# Patient Record
Sex: MALE | Race: WHITE | HISPANIC OR LATINO
[De-identification: names, ages, dates, MRNs, and addresses within clinical notes are randomized per-mention and may not be internally consistent; named-entity substitution may affect disease eponyms.]

---

## 2017-05-27 ENCOUNTER — RX RENEWAL (OUTPATIENT)
Age: 32
End: 2017-05-27

## 2017-07-27 ENCOUNTER — APPOINTMENT (OUTPATIENT)
Dept: FAMILY MEDICINE | Facility: CLINIC | Age: 32
End: 2017-07-27

## 2019-08-01 ENCOUNTER — APPOINTMENT (OUTPATIENT)
Dept: FAMILY MEDICINE | Facility: CLINIC | Age: 34
End: 2019-08-01
Payer: COMMERCIAL

## 2019-08-01 VITALS
HEIGHT: 68 IN | RESPIRATION RATE: 15 BRPM | BODY MASS INDEX: 19.85 KG/M2 | OXYGEN SATURATION: 99 % | SYSTOLIC BLOOD PRESSURE: 110 MMHG | HEART RATE: 72 BPM | DIASTOLIC BLOOD PRESSURE: 70 MMHG | WEIGHT: 131 LBS

## 2019-08-01 PROCEDURE — 99395 PREV VISIT EST AGE 18-39: CPT

## 2019-08-07 ENCOUNTER — TRANSCRIPTION ENCOUNTER (OUTPATIENT)
Age: 34
End: 2019-08-07

## 2019-08-08 LAB
25(OH)D3 SERPL-MCNC: 47.4 NG/ML
ALBUMIN SERPL ELPH-MCNC: 4.6 G/DL
ALP BLD-CCNC: 52 U/L
ALT SERPL-CCNC: 10 U/L
ANION GAP SERPL CALC-SCNC: 10 MMOL/L
APPEARANCE: CLEAR
AST SERPL-CCNC: 21 U/L
BACTERIA: NEGATIVE
BASOPHILS # BLD AUTO: 0.05 K/UL
BASOPHILS NFR BLD AUTO: 1 %
BILIRUB SERPL-MCNC: 0.2 MG/DL
BILIRUBIN URINE: NEGATIVE
BLOOD URINE: NEGATIVE
BUN SERPL-MCNC: 10 MG/DL
CALCIUM SERPL-MCNC: 9.6 MG/DL
CHLORIDE SERPL-SCNC: 104 MMOL/L
CHOLEST SERPL-MCNC: 191 MG/DL
CHOLEST/HDLC SERPL: 3.3 RATIO
CO2 SERPL-SCNC: 27 MMOL/L
COLOR: YELLOW
CREAT SERPL-MCNC: 1.01 MG/DL
EOSINOPHIL # BLD AUTO: 0.41 K/UL
EOSINOPHIL NFR BLD AUTO: 7.8 %
ESTIMATED AVERAGE GLUCOSE: 108 MG/DL
GLUCOSE QUALITATIVE U: NEGATIVE
GLUCOSE SERPL-MCNC: 105 MG/DL
HBA1C MFR BLD HPLC: 5.4 %
HCT VFR BLD CALC: 49.6 %
HDLC SERPL-MCNC: 58 MG/DL
HGB BLD-MCNC: 15.7 G/DL
HYALINE CASTS: 0 /LPF
IMM GRANULOCYTES NFR BLD AUTO: 0.4 %
KETONES URINE: NEGATIVE
LDLC SERPL CALC-MCNC: 120 MG/DL
LEUKOCYTE ESTERASE URINE: NEGATIVE
LYMPHOCYTES # BLD AUTO: 1.67 K/UL
LYMPHOCYTES NFR BLD AUTO: 31.7 %
MAN DIFF?: NORMAL
MCHC RBC-ENTMCNC: 31.4 PG
MCHC RBC-ENTMCNC: 31.7 GM/DL
MCV RBC AUTO: 99.2 FL
MICROSCOPIC-UA: NORMAL
MONOCYTES # BLD AUTO: 0.45 K/UL
MONOCYTES NFR BLD AUTO: 8.6 %
NEUTROPHILS # BLD AUTO: 2.66 K/UL
NEUTROPHILS NFR BLD AUTO: 50.5 %
NITRITE URINE: NEGATIVE
PH URINE: 7.5
PLATELET # BLD AUTO: 269 K/UL
POTASSIUM SERPL-SCNC: 5.2 MMOL/L
PROT SERPL-MCNC: 7 G/DL
PROTEIN URINE: NEGATIVE
RBC # BLD: 5 M/UL
RBC # FLD: 13.4 %
RED BLOOD CELLS URINE: 3 /HPF
SODIUM SERPL-SCNC: 141 MMOL/L
SPECIFIC GRAVITY URINE: 1.02
SQUAMOUS EPITHELIAL CELLS: 0 /HPF
TESTOST SERPL-MCNC: 573 NG/DL
TRIGL SERPL-MCNC: 67 MG/DL
TSH SERPL-ACNC: 2.4 UIU/ML
UROBILINOGEN URINE: NORMAL
WBC # FLD AUTO: 5.26 K/UL
WHITE BLOOD CELLS URINE: 0 /HPF

## 2021-04-07 ENCOUNTER — APPOINTMENT (OUTPATIENT)
Dept: FAMILY MEDICINE | Facility: CLINIC | Age: 36
End: 2021-04-07
Payer: COMMERCIAL

## 2021-04-07 ENCOUNTER — NON-APPOINTMENT (OUTPATIENT)
Age: 36
End: 2021-04-07

## 2021-04-07 VITALS
BODY MASS INDEX: 19.85 KG/M2 | HEIGHT: 68 IN | DIASTOLIC BLOOD PRESSURE: 80 MMHG | HEART RATE: 62 BPM | SYSTOLIC BLOOD PRESSURE: 120 MMHG | WEIGHT: 131 LBS | OXYGEN SATURATION: 96 %

## 2021-04-07 PROCEDURE — 99072 ADDL SUPL MATRL&STAF TM PHE: CPT

## 2021-04-07 PROCEDURE — 99213 OFFICE O/P EST LOW 20 MIN: CPT

## 2021-04-07 NOTE — ASSESSMENT
[FreeTextEntry1] : acute bronchitis\par Patient was advised to take all medications as prescribed and to finish any antibiotics in their entirety. Patient was told to rest, hydrate and treat symptoms as necessary. Patient was advised to return to this office or go directly to the ER if symptoms do not improve or if any worsening occurs.\par

## 2021-04-07 NOTE — HISTORY OF PRESENT ILLNESS
[FreeTextEntry8] : 35 year old male here with complaints of feeling he cannot breath for past two nights. Patients active medications, allergies and issues were all reviewed with the patient at time of visit.\par

## 2021-04-07 NOTE — PHYSICAL EXAM
[Well Nourished] : well nourished [Regular Rhythm] : with a regular rhythm [Normal S1, S2] : normal S1 and S2 [de-identified] : left rhonchi, wheezing

## 2021-04-26 ENCOUNTER — APPOINTMENT (OUTPATIENT)
Dept: FAMILY MEDICINE | Facility: CLINIC | Age: 36
End: 2021-04-26
Payer: COMMERCIAL

## 2021-04-26 DIAGNOSIS — R19.5 OTHER FECAL ABNORMALITIES: ICD-10-CM

## 2021-04-26 DIAGNOSIS — R14.3 FLATULENCE: ICD-10-CM

## 2021-04-26 PROCEDURE — 99213 OFFICE O/P EST LOW 20 MIN: CPT | Mod: 95

## 2021-04-26 RX ORDER — AZITHROMYCIN 250 MG/1
250 TABLET, FILM COATED ORAL
Qty: 1 | Refills: 0 | Status: DISCONTINUED | COMMUNITY
Start: 2021-04-07 | End: 2021-04-26

## 2021-04-26 NOTE — PLAN
[FreeTextEntry1] : Loose stools/Gas-Supportive Care measures discussed.  Possibly related to restaurant food vs. resolving gastroenteritis. \par Advised to avoid any spicy, greasy, acidic foods that may be irritating to stomach.\par Increase hydration.  \par \par Can trial OTC Tums or Mylanta for symptoms.\par \par Discussed with Scotty LOU precautions and advised to go to seek immediate medical re-evaluation if condition worsened.\par If not improving patient to follow-up in office.   Mr. MAYITO BLAKE expressed understanding of the plan.\par

## 2021-04-26 NOTE — REVIEW OF SYSTEMS
[Abdominal Pain] : no abdominal pain [Vomiting] : no vomiting [Heartburn] : no heartburn [Melena] : no melena [Headache] : no headache [Dizziness] : no dizziness [Negative] : Genitourinary [FreeTextEntry7] : gas

## 2021-04-26 NOTE — PHYSICAL EXAM
[No Respiratory Distress] : no respiratory distress  [Normal] : affect was normal and insight and judgment were intact [de-identified] : No visible rash

## 2021-04-26 NOTE — HISTORY OF PRESENT ILLNESS
[Home] : at home, [unfilled] , at the time of the visit. [Other Location: e.g. Home (Enter Location, City,State)___] : at [unfilled] [Verbal consent obtained from patient] : the patient, [unfilled] [___ Days ago] : [unfilled] days ago [Episodic] : episodic [Nausea] : nausea [Diarrhea] : diarrhea [Constipation] : no constipation [Abdominal Pain] : no abdominal pain [OTC Remedies] : OTC remedies [Stable] : stable [FreeTextEntry8] : \par Wednesday night ate out at a diner, had taco steak. Started having diarrhea Thursday. \par No vomitting; mild nausea on and off. \par Very loose stools. \par No other diners in the party had symptoms, but no on else had the same dish.\par Gas last night. Tried TUMS. \par Has been eating his regular diet. Egg sandwich this AM with coffee. Will still have loose stools.  No blood in toilet bowl, no sharp abdominal pain \par Admits he does not drink enough water throughout the day. \par \par Medications and allergies reviewed.\par Son sick at home-mild viral syndrome. No GI symptoms. \par \par

## 2022-01-03 ENCOUNTER — APPOINTMENT (OUTPATIENT)
Dept: FAMILY MEDICINE | Facility: CLINIC | Age: 37
End: 2022-01-03
Payer: COMMERCIAL

## 2022-01-03 ENCOUNTER — TRANSCRIPTION ENCOUNTER (OUTPATIENT)
Age: 37
End: 2022-01-03

## 2022-01-03 VITALS
DIASTOLIC BLOOD PRESSURE: 80 MMHG | HEIGHT: 68 IN | BODY MASS INDEX: 21.07 KG/M2 | WEIGHT: 139 LBS | SYSTOLIC BLOOD PRESSURE: 130 MMHG

## 2022-01-03 DIAGNOSIS — J45.909 UNSPECIFIED ASTHMA, UNCOMPLICATED: ICD-10-CM

## 2022-01-03 DIAGNOSIS — J01.90 ACUTE SINUSITIS, UNSPECIFIED: ICD-10-CM

## 2022-01-03 PROCEDURE — 99213 OFFICE O/P EST LOW 20 MIN: CPT

## 2022-01-03 NOTE — ASSESSMENT
[FreeTextEntry1] : uri\par The symptoms that are occurring are most likely secondary to virus, therefore antibiotics are deferred at this time. Patient was told to rest, hydrate and treat symptoms as necessary. May use tylenol/ibuprofen as necessary for symptomatic relief.  If symptoms worsen or do not improve return to this office, seek care or go directly to the ER.\par \par covid sent\par medrol\par

## 2022-01-03 NOTE — PHYSICAL EXAM
[Well Nourished] : well nourished [Regular Rhythm] : with a regular rhythm [Normal S1, S2] : normal S1 and S2 [de-identified] : left rhonchi, wheezing

## 2022-01-07 LAB — SARS-COV-2 N GENE NPH QL NAA+PROBE: NOT DETECTED

## 2022-05-05 ENCOUNTER — APPOINTMENT (OUTPATIENT)
Dept: FAMILY MEDICINE | Facility: CLINIC | Age: 37
End: 2022-05-05
Payer: COMMERCIAL

## 2022-05-05 VITALS
WEIGHT: 139 LBS | BODY MASS INDEX: 27.29 KG/M2 | DIASTOLIC BLOOD PRESSURE: 72 MMHG | HEART RATE: 68 BPM | OXYGEN SATURATION: 98 % | HEIGHT: 60 IN | SYSTOLIC BLOOD PRESSURE: 125 MMHG | TEMPERATURE: 97.8 F

## 2022-05-05 DIAGNOSIS — S39.013A STRAIN OF MUSCLE, FASCIA AND TENDON OF PELVIS, INITIAL ENCOUNTER: ICD-10-CM

## 2022-05-05 PROCEDURE — 99213 OFFICE O/P EST LOW 20 MIN: CPT

## 2022-05-11 ENCOUNTER — NON-APPOINTMENT (OUTPATIENT)
Age: 37
End: 2022-05-11

## 2022-06-01 ENCOUNTER — APPOINTMENT (OUTPATIENT)
Dept: FAMILY MEDICINE | Facility: CLINIC | Age: 37
End: 2022-06-01

## 2022-08-02 ENCOUNTER — APPOINTMENT (OUTPATIENT)
Dept: OTOLARYNGOLOGY | Facility: CLINIC | Age: 37
End: 2022-08-02

## 2022-08-02 VITALS
DIASTOLIC BLOOD PRESSURE: 80 MMHG | BODY MASS INDEX: 21.22 KG/M2 | SYSTOLIC BLOOD PRESSURE: 128 MMHG | HEART RATE: 80 BPM | WEIGHT: 140 LBS | HEIGHT: 68 IN

## 2022-08-02 DIAGNOSIS — Z87.891 PERSONAL HISTORY OF NICOTINE DEPENDENCE: ICD-10-CM

## 2022-08-02 DIAGNOSIS — Z83.3 FAMILY HISTORY OF DIABETES MELLITUS: ICD-10-CM

## 2022-08-02 DIAGNOSIS — Z80.9 FAMILY HISTORY OF MALIGNANT NEOPLASM, UNSPECIFIED: ICD-10-CM

## 2022-08-02 DIAGNOSIS — J38.7 OTHER DISEASES OF LARYNX: ICD-10-CM

## 2022-08-02 DIAGNOSIS — J39.2 OTHER DISEASES OF PHARYNX: ICD-10-CM

## 2022-08-02 DIAGNOSIS — Z78.9 OTHER SPECIFIED HEALTH STATUS: ICD-10-CM

## 2022-08-02 PROCEDURE — 31575 DIAGNOSTIC LARYNGOSCOPY: CPT

## 2022-08-02 PROCEDURE — 99243 OFF/OP CNSLTJ NEW/EST LOW 30: CPT | Mod: 25

## 2022-08-02 RX ORDER — METHYLPREDNISOLONE 4 MG/1
4 TABLET ORAL
Qty: 21 | Refills: 0 | Status: COMPLETED | COMMUNITY
Start: 2022-01-03 | End: 2022-08-02

## 2022-08-02 NOTE — CONSULT LETTER
[Dear  ___] : Dear  [unfilled], [Consult Letter:] : I had the pleasure of evaluating your patient, [unfilled]. [Please see my note below.] : Please see my note below. [Consult Closing:] : Thank you very much for allowing me to participate in the care of this patient.  If you have any questions, please do not hesitate to contact me. [Sincerely,] : Sincerely, [FreeTextEntry2] : Sandoval Flores MD ( [place], [place]) [FreeTextEntry3] : Christo Willett MD, FACS\par \par [facility]\par Associate Chair\par Department of Otolaryngology\par Professor\par Otolaryngology & Molecular Medicine\par [employer]\par
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
## 2022-08-02 NOTE — PROCEDURE
[None] : none [Flexible Endoscope] : examined with the flexible endoscope [Normal] : normal [de-identified] : c NEHAL.  no lesions seen

## 2022-10-18 ENCOUNTER — APPOINTMENT (OUTPATIENT)
Dept: OTOLARYNGOLOGY | Facility: CLINIC | Age: 37
End: 2022-10-18

## 2023-04-21 ENCOUNTER — APPOINTMENT (OUTPATIENT)
Dept: FAMILY MEDICINE | Facility: CLINIC | Age: 38
End: 2023-04-21

## 2023-04-27 ENCOUNTER — APPOINTMENT (OUTPATIENT)
Dept: FAMILY MEDICINE | Facility: CLINIC | Age: 38
End: 2023-04-27
Payer: COMMERCIAL

## 2023-04-27 VITALS
HEIGHT: 68 IN | DIASTOLIC BLOOD PRESSURE: 86 MMHG | HEART RATE: 80 BPM | BODY MASS INDEX: 21.24 KG/M2 | SYSTOLIC BLOOD PRESSURE: 132 MMHG | WEIGHT: 140.13 LBS | OXYGEN SATURATION: 98 %

## 2023-04-27 DIAGNOSIS — F41.9 ANXIETY DISORDER, UNSPECIFIED: ICD-10-CM

## 2023-04-27 DIAGNOSIS — Z00.00 ENCOUNTER FOR GENERAL ADULT MEDICAL EXAMINATION W/OUT ABNORMAL FINDINGS: ICD-10-CM

## 2023-04-27 PROCEDURE — 36415 COLL VENOUS BLD VENIPUNCTURE: CPT

## 2023-04-27 PROCEDURE — 99395 PREV VISIT EST AGE 18-39: CPT | Mod: 25

## 2023-04-28 LAB
25(OH)D3 SERPL-MCNC: 47.6 NG/ML
ALBUMIN SERPL ELPH-MCNC: 4.8 G/DL
ALP BLD-CCNC: 50 U/L
ALT SERPL-CCNC: 18 U/L
ANION GAP SERPL CALC-SCNC: 13 MMOL/L
APPEARANCE: CLEAR
AST SERPL-CCNC: 26 U/L
BACTERIA: NEGATIVE /HPF
BASOPHILS # BLD AUTO: 0.03 K/UL
BASOPHILS NFR BLD AUTO: 0.4 %
BILIRUB SERPL-MCNC: 0.4 MG/DL
BILIRUBIN URINE: NEGATIVE
BLOOD URINE: NEGATIVE
BUN SERPL-MCNC: 12 MG/DL
CALCIUM SERPL-MCNC: 9.6 MG/DL
CAST: 0 /LPF
CHLORIDE SERPL-SCNC: 102 MMOL/L
CHOLEST SERPL-MCNC: 184 MG/DL
CO2 SERPL-SCNC: 23 MMOL/L
COLOR: YELLOW
CREAT SERPL-MCNC: 1 MG/DL
EGFR: 99 ML/MIN/1.73M2
EOSINOPHIL # BLD AUTO: 0.03 K/UL
EOSINOPHIL NFR BLD AUTO: 0.4 %
EPITHELIAL CELLS: 0 /HPF
ESTIMATED AVERAGE GLUCOSE: 114 MG/DL
GLUCOSE QUALITATIVE U: NEGATIVE MG/DL
GLUCOSE SERPL-MCNC: 97 MG/DL
HBA1C MFR BLD HPLC: 5.6 %
HCT VFR BLD CALC: 44.3 %
HDLC SERPL-MCNC: 69 MG/DL
HGB BLD-MCNC: 14.6 G/DL
IMM GRANULOCYTES NFR BLD AUTO: 0.2 %
KETONES URINE: 15 MG/DL
LDLC SERPL CALC-MCNC: 104 MG/DL
LEUKOCYTE ESTERASE URINE: NEGATIVE
LYMPHOCYTES # BLD AUTO: 1.13 K/UL
LYMPHOCYTES NFR BLD AUTO: 13.5 %
MAN DIFF?: NORMAL
MCHC RBC-ENTMCNC: 31.1 PG
MCHC RBC-ENTMCNC: 33 GM/DL
MCV RBC AUTO: 94.5 FL
MICROSCOPIC-UA: NORMAL
MONOCYTES # BLD AUTO: 0.31 K/UL
MONOCYTES NFR BLD AUTO: 3.7 %
NEUTROPHILS # BLD AUTO: 6.85 K/UL
NEUTROPHILS NFR BLD AUTO: 81.8 %
NITRITE URINE: NEGATIVE
NONHDLC SERPL-MCNC: 116 MG/DL
PH URINE: 6
PLATELET # BLD AUTO: 247 K/UL
POTASSIUM SERPL-SCNC: 4.1 MMOL/L
PROT SERPL-MCNC: 7.4 G/DL
PROTEIN URINE: NORMAL MG/DL
RBC # BLD: 4.69 M/UL
RBC # FLD: 14.1 %
RED BLOOD CELLS URINE: 1 /HPF
SODIUM SERPL-SCNC: 138 MMOL/L
SPECIFIC GRAVITY URINE: 1.03
TRIGL SERPL-MCNC: 57 MG/DL
TSH SERPL-ACNC: 1.13 UIU/ML
UROBILINOGEN URINE: 0.2 MG/DL
WBC # FLD AUTO: 8.37 K/UL
WHITE BLOOD CELLS URINE: 0 /HPF

## 2024-02-13 ENCOUNTER — RX RENEWAL (OUTPATIENT)
Age: 39
End: 2024-02-13

## 2024-02-13 RX ORDER — ALBUTEROL SULFATE 90 UG/1
108 (90 BASE) INHALANT RESPIRATORY (INHALATION)
Qty: 1 | Refills: 0 | Status: ACTIVE | COMMUNITY
Start: 2021-04-07 | End: 1900-01-01

## 2024-03-28 ENCOUNTER — APPOINTMENT (OUTPATIENT)
Dept: ORTHOPEDIC SURGERY | Facility: CLINIC | Age: 39
End: 2024-03-28
Payer: COMMERCIAL

## 2024-03-28 VITALS — HEIGHT: 68 IN | BODY MASS INDEX: 21.98 KG/M2 | WEIGHT: 145 LBS

## 2024-03-28 PROCEDURE — 29125 APPL SHORT ARM SPLINT STATIC: CPT

## 2024-03-28 PROCEDURE — 99204 OFFICE O/P NEW MOD 45 MIN: CPT | Mod: 25

## 2024-03-28 RX ORDER — OXYCODONE AND ACETAMINOPHEN 5; 325 MG/1; MG/1
5-325 TABLET ORAL EVERY 4 HOURS
Qty: 30 | Refills: 0 | Status: ACTIVE | COMMUNITY
Start: 2024-03-28 | End: 1900-01-01

## 2024-03-28 NOTE — IMAGING
[de-identified] : right hand: swelling/ecchymosis ttp over neck of 5th MC, base of 4th MC, and 5th CMC rom not assessed nvid  xrays 3 views right hand show 5th MC neck fx, 4th MC base fx, and 5th CMC dislocation

## 2024-03-28 NOTE — HISTORY OF PRESENT ILLNESS
[de-identified] : 3/28/24:  right hand versus wall stud 6 days ago.  nafisa DONOHUE  PMhx: acute asthma Meds: none  NKDA

## 2024-03-28 NOTE — HISTORY OF PRESENT ILLNESS
[de-identified] : 3/28/24:  right hand versus wall stud 6 days ago.  nafisa DONOHUE  PMhx: acute asthma Meds: none  NKDA

## 2024-03-28 NOTE — IMAGING
[de-identified] : right hand: swelling/ecchymosis ttp over neck of 5th MC, base of 4th MC, and 5th CMC rom not assessed nvid  xrays 3 views right hand show 5th MC neck fx, 4th MC base fx, and 5th CMC dislocation

## 2024-03-28 NOTE — ASSESSMENT
[FreeTextEntry1] : The fracture was discussed with the patient at length.  We discussed that although there may be some imperfect alignment on X-ray, the patient would likely do best with fracture fixation. The goal of fixation is to align the bones to an acceptable degree of angulation and rotation.  After surgery, the hand may be splinted in a protected position to keep ligaments on stretch.  Exposed joints should be moved early to maximize functional recovery. Again, the goal is to maximize functional recovery, not to achieve perfect x-rays.  We discussed the general risks of fracture surgery including bleeding, infection, nonunion, malunion, hardware failure, injury to nerves, vessels or tendons, stiffness, pain, CRPS, loss of function, need for additional surgery, loss of limb and risks and complications of anesthesia up to and including loss of life.  We discussed the importance of good function over good X-rays and that performing open surgery with plates and or screws may lead to unnecessary scar tissue formation.  The patient may benefit from therapy.  We discussed non-operative treatment and what to expect with that.  All patient questions were answered. Patient expressed understanding and would like to proceed with the operative treatment plan.  Risks including but not limited to infection, blood loss, tendon damage and delayed tendon disruption, muscle damage, nerve damage, stiffness, pain, arthritis, loss of function, potential for secondary surgery, loss of limb or life. The patient had the opportunity to ask questions and all questions were answered to their satisfaction.  A splint was applied.  The importance of ice and elevation were discussed with the patient.  The risks were also discussed such as compartment syndrome and skin breakdown.  They were instructed to never put foreign objects down the splint.  Patients should call for increasing pain, worsening swelling, numbness, extremity discoloration, or any other concerns.

## 2024-03-29 ENCOUNTER — APPOINTMENT (OUTPATIENT)
Age: 39
End: 2024-03-29
Payer: COMMERCIAL

## 2024-03-29 PROCEDURE — 26615 TREAT METACARPAL FRACTURE: CPT | Mod: AS,RT

## 2024-03-29 PROCEDURE — 26676 PIN HAND DISLOCATION: CPT | Mod: 59,RT

## 2024-03-29 PROCEDURE — 29125 APPL SHORT ARM SPLINT STATIC: CPT | Mod: 59,RT

## 2024-03-29 PROCEDURE — 26615 TREAT METACARPAL FRACTURE: CPT | Mod: RT

## 2024-03-29 PROCEDURE — 26676 PIN HAND DISLOCATION: CPT | Mod: AS,59,RT

## 2024-04-09 ENCOUNTER — APPOINTMENT (OUTPATIENT)
Dept: ORTHOPEDIC SURGERY | Facility: CLINIC | Age: 39
End: 2024-04-09
Payer: COMMERCIAL

## 2024-04-09 VITALS — HEIGHT: 68 IN | WEIGHT: 145 LBS | BODY MASS INDEX: 21.98 KG/M2

## 2024-04-09 PROCEDURE — 73130 X-RAY EXAM OF HAND: CPT | Mod: RT

## 2024-04-09 PROCEDURE — 99024 POSTOP FOLLOW-UP VISIT: CPT

## 2024-04-09 NOTE — HISTORY OF PRESENT ILLNESS
[de-identified] : DOS: 3/29/24- RIGHT 5TH AND 4TH MC CRPP AND 5TH MC ORIF  4/9/24:  Pt is doing well  3/28/24:  right hand versus wall stud 6 days ago.  nafisa DONOHUE  PMhx: acute asthma Meds: none  NKDA

## 2024-04-09 NOTE — REASON FOR VISIT
[FreeTextEntry2] : 04/09/2024 :MAYITO BLAKE , a 38 year old male, presents today for right hand fx po #1

## 2024-04-09 NOTE — IMAGING
[de-identified] : wound clean dry and intact no erythema no drainage rom not assessed NV unchanged sutures removed [Right] : right hand [No loss of surgical correlation. Bony alignment acceptable. Hardware in appropriate position] : No loss of surgical correlation. Bony alignment acceptable. Hardware in appropriate position [The fracture is in acceptable alignment. There is progression in healing seen] : The fracture is in acceptable alignment. There is progression in healing seen

## 2024-05-07 ENCOUNTER — APPOINTMENT (OUTPATIENT)
Dept: ORTHOPEDIC SURGERY | Facility: CLINIC | Age: 39
End: 2024-05-07
Payer: COMMERCIAL

## 2024-05-07 VITALS — BODY MASS INDEX: 21.98 KG/M2 | WEIGHT: 145 LBS | HEIGHT: 68 IN

## 2024-05-07 PROCEDURE — 99024 POSTOP FOLLOW-UP VISIT: CPT

## 2024-05-07 PROCEDURE — 73130 X-RAY EXAM OF HAND: CPT | Mod: RT

## 2024-05-07 NOTE — REASON FOR VISIT
[FreeTextEntry2] : 05/07/24 - :MAYITO BLAKE , a 38 year old male, presents today for right hand fx po #2

## 2024-05-07 NOTE — HISTORY OF PRESENT ILLNESS
[de-identified] : DOS: 3/29/24- RIGHT 5TH AND 4TH MC CRPP AND 5TH MC ORIF  5/7/24:  Pt has been doing therapy and wearing brace with improvement.  4/9/24:  Pt is doing well  3/28/24:  right hand versus wall stud 6 days ago.  RHD,   PMhx: acute asthma Meds: none  NKDA

## 2024-05-07 NOTE — IMAGING
[Right] : right hand [No loss of surgical correlation. Bony alignment acceptable. Hardware in appropriate position] : No loss of surgical correlation. Bony alignment acceptable. Hardware in appropriate position [The fracture is in acceptable alignment. There is progression in healing seen] : The fracture is in acceptable alignment. There is progression in healing seen [de-identified] : wound clean dry and intact no erythema no drainage stiffness NV unchanged pins removed

## 2024-06-11 ENCOUNTER — APPOINTMENT (OUTPATIENT)
Dept: FAMILY MEDICINE | Facility: CLINIC | Age: 39
End: 2024-06-11

## 2024-06-18 ENCOUNTER — APPOINTMENT (OUTPATIENT)
Dept: ORTHOPEDIC SURGERY | Facility: CLINIC | Age: 39
End: 2024-06-18
Payer: COMMERCIAL

## 2024-06-18 DIAGNOSIS — S62.366A NONDISPLACED FRACTURE OF NECK OF FIFTH METACARPAL BONE, RIGHT HAND, INITIAL ENCOUNTER FOR CLOSED FRACTURE: ICD-10-CM

## 2024-06-18 DIAGNOSIS — S63.054A DISLOCATION OF OTHER CARPOMETACARPAL JOINT OF RIGHT HAND, INITIAL ENCOUNTER: ICD-10-CM

## 2024-06-18 DIAGNOSIS — S62.344A NONDISPLACED FRACTURE OF BASE OF FOURTH METACARPAL BONE, RIGHT HAND, INITIAL ENCOUNTER FOR CLOSED FRACTURE: ICD-10-CM

## 2024-06-18 PROCEDURE — 73130 X-RAY EXAM OF HAND: CPT | Mod: RT

## 2024-06-18 PROCEDURE — 99024 POSTOP FOLLOW-UP VISIT: CPT

## 2024-06-18 NOTE — IMAGING
[Right] : right hand [No loss of surgical correlation. Bony alignment acceptable. Hardware in appropriate position] : No loss of surgical correlation. Bony alignment acceptable. Hardware in appropriate position [The fracture is in acceptable alignment. There is progression in healing seen] : The fracture is in acceptable alignment. There is progression in healing seen [de-identified] : well healed scar SF MP 0-75 NV unchanged 4/5  strength

## 2024-06-18 NOTE — REASON FOR VISIT
[FreeTextEntry2] : 05/07/24 - :MAYITO BLAKE , a 38 year old male, presents today for right hand fx po #3

## 2024-06-18 NOTE — HISTORY OF PRESENT ILLNESS
[de-identified] : DOS: 3/29/24- RIGHT 5TH AND 4TH MC CRPP AND 5TH MC ORIF  6/18/24:  Pt has been doing therapy and rom is improving gradually  5/7/24:  Pt has been doing therapy and wearing brace with improvement.  4/9/24:  Pt is doing well  3/28/24:  right hand versus wall stud 6 days ago.  RHD,   PMhx: acute asthma Meds: none  NKDA

## 2024-09-19 ENCOUNTER — RX RENEWAL (OUTPATIENT)
Age: 39
End: 2024-09-19

## 2024-11-04 ENCOUNTER — APPOINTMENT (OUTPATIENT)
Dept: ORTHOPEDIC SURGERY | Facility: CLINIC | Age: 39
End: 2024-11-04
Payer: COMMERCIAL

## 2024-11-04 DIAGNOSIS — S62.344A NONDISPLACED FRACTURE OF BASE OF FOURTH METACARPAL BONE, RIGHT HAND, INITIAL ENCOUNTER FOR CLOSED FRACTURE: ICD-10-CM

## 2024-11-04 DIAGNOSIS — S62.366A NONDISPLACED FRACTURE OF NECK OF FIFTH METACARPAL BONE, RIGHT HAND, INITIAL ENCOUNTER FOR CLOSED FRACTURE: ICD-10-CM

## 2024-11-04 DIAGNOSIS — S63.054A DISLOCATION OF OTHER CARPOMETACARPAL JOINT OF RIGHT HAND, INITIAL ENCOUNTER: ICD-10-CM

## 2024-11-04 PROCEDURE — 73130 X-RAY EXAM OF HAND: CPT | Mod: RT

## 2024-11-04 PROCEDURE — 99213 OFFICE O/P EST LOW 20 MIN: CPT

## 2024-11-04 NOTE — IMAGING
[Right] : right hand [No loss of surgical correlation. Bony alignment acceptable. Hardware in appropriate position] : No loss of surgical correlation. Bony alignment acceptable. Hardware in appropriate position [The fracture is in acceptable alignment. There is progression in healing seen] : The fracture is in acceptable alignment. There is progression in healing seen [de-identified] : well healed scar SF MP 0-90 NV unchanged 5/5  and intrinsic strength TTP over distal shaft of 5th MC noted no cmc or mcp instability.   Right hand 3 view xray performed 11/4/2024: solid union with fracture in acceptable alignment. Hardware in good position.

## 2024-11-04 NOTE — HISTORY OF PRESENT ILLNESS
[de-identified] : DOS: 3/29/24- RIGHT 5TH AND 4TH MC CRPP AND 5TH MC ORIF  11/4/2024: Pt here for f/u s/p right 4th MC closed reduction CRPP  and 5th MC ORIF. Pt still has pain with grasping and lifting.   6/18/24:  Pt has been doing therapy and rom is improving gradually  5/7/24:  Pt has been doing therapy and wearing brace with improvement.  4/9/24:  Pt is doing well  3/28/24:  right hand versus wall stud 6 days ago.  RHD,   PMhx: acute asthma Meds: none  NKDA [FreeTextEntry5] : Patient states pain has improved since last visit, needs new PT script

## 2025-04-22 ENCOUNTER — APPOINTMENT (OUTPATIENT)
Dept: FAMILY MEDICINE | Facility: CLINIC | Age: 40
End: 2025-04-22